# Patient Record
Sex: FEMALE | Race: WHITE
[De-identification: names, ages, dates, MRNs, and addresses within clinical notes are randomized per-mention and may not be internally consistent; named-entity substitution may affect disease eponyms.]

---

## 2020-05-08 ENCOUNTER — HOSPITAL ENCOUNTER (OUTPATIENT)
Dept: HOSPITAL 19 - MC.RAD | Age: 55
End: 2020-05-08
Payer: COMMERCIAL

## 2020-05-08 DIAGNOSIS — Z12.31: Primary | ICD-10-CM

## 2020-05-08 DIAGNOSIS — R92.0: ICD-10-CM

## 2020-05-12 ENCOUNTER — HOSPITAL ENCOUNTER (OUTPATIENT)
Dept: HOSPITAL 19 - MC.RAD | Age: 55
End: 2020-05-12
Payer: COMMERCIAL

## 2020-05-12 DIAGNOSIS — N64.89: ICD-10-CM

## 2020-05-12 DIAGNOSIS — R92.1: Primary | ICD-10-CM

## 2020-05-12 DIAGNOSIS — N63.10: ICD-10-CM

## 2022-09-23 ENCOUNTER — HOSPITAL ENCOUNTER (OUTPATIENT)
Dept: HOSPITAL 19 - MC.RAD | Age: 57
End: 2022-09-23
Attending: FAMILY MEDICINE
Payer: COMMERCIAL

## 2022-09-23 DIAGNOSIS — Z12.31: Primary | ICD-10-CM

## 2022-09-23 DIAGNOSIS — R92.0: ICD-10-CM

## 2022-10-26 ENCOUNTER — HOSPITAL ENCOUNTER (OUTPATIENT)
Dept: HOSPITAL 19 - MC.RAD | Age: 57
End: 2022-10-26
Attending: SURGERY
Payer: COMMERCIAL

## 2022-10-26 DIAGNOSIS — D05.11: Primary | ICD-10-CM

## 2022-10-26 PROCEDURE — 32603: CPT

## 2022-10-26 PROCEDURE — C1769 GUIDE WIRE: HCPCS

## 2022-10-28 ENCOUNTER — HOSPITAL ENCOUNTER (OUTPATIENT)
Dept: HOSPITAL 19 - SDCO | Age: 57
Discharge: HOME | End: 2022-10-28
Attending: SURGERY
Payer: COMMERCIAL

## 2022-10-28 VITALS — HEART RATE: 75 BPM | TEMPERATURE: 98.3 F | DIASTOLIC BLOOD PRESSURE: 58 MMHG | SYSTOLIC BLOOD PRESSURE: 124 MMHG

## 2022-10-28 VITALS — HEART RATE: 76 BPM | DIASTOLIC BLOOD PRESSURE: 60 MMHG | SYSTOLIC BLOOD PRESSURE: 128 MMHG

## 2022-10-28 VITALS — SYSTOLIC BLOOD PRESSURE: 125 MMHG | HEART RATE: 83 BPM | DIASTOLIC BLOOD PRESSURE: 58 MMHG

## 2022-10-28 VITALS — SYSTOLIC BLOOD PRESSURE: 137 MMHG | HEART RATE: 91 BPM | TEMPERATURE: 97.9 F | DIASTOLIC BLOOD PRESSURE: 73 MMHG

## 2022-10-28 VITALS — DIASTOLIC BLOOD PRESSURE: 64 MMHG | HEART RATE: 77 BPM | SYSTOLIC BLOOD PRESSURE: 125 MMHG

## 2022-10-28 VITALS — BODY MASS INDEX: 34.7 KG/M2 | WEIGHT: 203.27 LBS | HEIGHT: 64.02 IN

## 2022-10-28 VITALS — DIASTOLIC BLOOD PRESSURE: 84 MMHG | SYSTOLIC BLOOD PRESSURE: 151 MMHG | HEART RATE: 86 BPM | TEMPERATURE: 98.5 F

## 2022-10-28 VITALS — SYSTOLIC BLOOD PRESSURE: 125 MMHG | HEART RATE: 85 BPM | DIASTOLIC BLOOD PRESSURE: 65 MMHG

## 2022-10-28 DIAGNOSIS — Z80.3: ICD-10-CM

## 2022-10-28 DIAGNOSIS — I10: ICD-10-CM

## 2022-10-28 DIAGNOSIS — D05.11: Primary | ICD-10-CM

## 2022-10-28 PROCEDURE — A4648 IMPLANTABLE TISSUE MARKER: HCPCS

## 2022-10-28 NOTE — NUR
1145-PT TO BAY 6 PER CART FROM PACU.  VS OBTAINED.  CALL LIGHT WITHIN REACH.
PT DENIES ANY NEEDS AT THIS TIME.
 
1245-PT TOLERATING WATER AND PUDDING.  CONTINUES TO DENY ANY NEEDS.
 
1310-IV DC'D AT THIS TIME.
 
1320-DISCHARGE EDUCATION COMPLETED WITH PT AND HER FAMILY.  VERBALIZED
UNDERSTANDING OF HOME AND FOLLOW UP CARE.  ALL QUESTIONS ANSWERED.  DISCHARGE
PAPERWORK GIVEN TO PT.
 
1335-PT OFF UNIT PER WHEELCHAIR.  PT DISCHARGED TO HOME WITH FAMILY PER
PERSONAL VEHICLE.

## 2023-10-05 ENCOUNTER — HOSPITAL ENCOUNTER (OUTPATIENT)
Dept: HOSPITAL 19 - MC.RAD | Age: 58
End: 2023-10-05
Attending: FAMILY MEDICINE
Payer: COMMERCIAL

## 2023-10-05 DIAGNOSIS — Z12.31: Primary | ICD-10-CM

## 2023-10-05 DIAGNOSIS — N60.19: ICD-10-CM

## 2024-10-07 ENCOUNTER — HOSPITAL ENCOUNTER (OUTPATIENT)
Dept: HOSPITAL 19 - MC.RAD | Age: 59
End: 2024-10-07
Payer: COMMERCIAL

## 2024-10-07 DIAGNOSIS — Z12.31: Primary | ICD-10-CM
